# Patient Record
Sex: OTHER/UNKNOWN | URBAN - METROPOLITAN AREA
[De-identification: names, ages, dates, MRNs, and addresses within clinical notes are randomized per-mention and may not be internally consistent; named-entity substitution may affect disease eponyms.]

---

## 2020-05-16 ENCOUNTER — APPOINTMENT (OUTPATIENT)
Age: 45
Setting detail: DERMATOLOGY
End: 2020-05-26

## 2020-05-16 VITALS
DIASTOLIC BLOOD PRESSURE: 83 MMHG | SYSTOLIC BLOOD PRESSURE: 130 MMHG | WEIGHT: 215 LBS | HEART RATE: 63 BPM | HEIGHT: 74 IN

## 2020-05-16 DIAGNOSIS — L85.3 XEROSIS CUTIS: ICD-10-CM

## 2020-05-16 DIAGNOSIS — L40.0 PSORIASIS VULGARIS: ICD-10-CM

## 2020-05-16 PROCEDURE — 99203 OFFICE O/P NEW LOW 30 MIN: CPT

## 2020-05-16 PROCEDURE — OTHER TREATMENT REGIMEN: OTHER

## 2020-05-16 PROCEDURE — OTHER PRESCRIPTION: OTHER

## 2020-05-16 PROCEDURE — OTHER COUNSELING: OTHER

## 2020-05-16 PROCEDURE — OTHER MIPS QUALITY: OTHER

## 2020-05-16 RX ORDER — HALOBETASOL PROPIONATE AND TAZAROTENE .1; .45 MG/G; MG/G
LOTION TOPICAL QD
Qty: 1 | Refills: 4 | Status: ERX | COMMUNITY
Start: 2020-05-16

## 2020-05-16 ASSESSMENT — LOCATION DETAILED DESCRIPTION DERM
LOCATION DETAILED: LEFT PROXIMAL DORSAL FOREARM
LOCATION DETAILED: LEFT KNEE
LOCATION DETAILED: RIGHT KNEE
LOCATION DETAILED: 2ND WEB SPACE LEFT HAND
LOCATION DETAILED: LEFT DORSAL RING METACARPOPHALANGEAL JOINT
LOCATION DETAILED: LEFT MEDIAL ELBOW

## 2020-05-16 ASSESSMENT — LOCATION SIMPLE DESCRIPTION DERM
LOCATION SIMPLE: LEFT HAND
LOCATION SIMPLE: LEFT ELBOW
LOCATION SIMPLE: RIGHT KNEE
LOCATION SIMPLE: LEFT KNEE
LOCATION SIMPLE: LEFT FOREARM

## 2020-05-16 ASSESSMENT — LOCATION ZONE DERM
LOCATION ZONE: ARM
LOCATION ZONE: HAND
LOCATION ZONE: LEG

## 2020-05-16 NOTE — PROCEDURE: TREATMENT REGIMEN
Other Instructions: Follow up in 4 weeks
Initiate Treatment: Cerave cream
Detail Level: Zone
Action 4: Continue
Samples Given: Duobri \\nCeraVe moisturizing cream
Discontinue Regimen: Triamcinolone
Start Regimen: Duobrii 0.01 %-0.045 % lotion QD\\nDays Supply: 30\\nSig: Apply to affected areas on body once daily after washing up to 8 weeks.